# Patient Record
Sex: MALE | Race: WHITE | URBAN - METROPOLITAN AREA
[De-identification: names, ages, dates, MRNs, and addresses within clinical notes are randomized per-mention and may not be internally consistent; named-entity substitution may affect disease eponyms.]

---

## 2022-03-19 PROBLEM — N52.9 IMPOTENCE: Status: ACTIVE | Noted: 2017-07-14

## 2022-03-19 PROBLEM — E23.0 HYPOGONADOTROPIC HYPOGONADISM IN MALE (HCC): Status: ACTIVE | Noted: 2019-01-29

## 2022-03-19 PROBLEM — D75.1 POLYCYTHEMIA: Status: ACTIVE | Noted: 2021-07-16

## 2022-03-20 PROBLEM — Q89.2 THYROGLOSSAL DUCT CYST: Status: ACTIVE | Noted: 2017-07-14

## 2022-11-21 NOTE — PROGRESS NOTES
[facility] CARDIOLOGY History & Physical                 Reason for Visit: Hyperlipidemia    Subjective:     Patient is a 41 y.o. male with a PMH of hyperlipidemia who presents as a referral for hyperlipidemia. The patient denies shortness of breath and dyspnea on exertion. He reports that he experiences his heart \"racing\" when he goes \"up and down the stairs really fast\". The patient reports that it is a gradual onset and offset tachycardia. He denies angina. He does report being under a lot of stress in the last 12 months with work as a . The patient reports that the last 12 months has been \"the most stressful in my life\". He reports that the last episode of palpitations occurred today. Past Medical History:   Diagnosis Date    Allergic rhinitis     Migraine     Thyroglossal duct cyst       Past Surgical History:   Procedure Laterality Date    HERNIA REPAIR        Family History   Problem Relation Age of Onset    Heart Disease Father     Thyroid Disease Sister         thyroidectomy/hypothyroidism    Diabetes Neg Hx       Social History     Tobacco Use    Smoking status: Never    Smokeless tobacco: Never   Substance Use Topics    Alcohol use: No      Allergies   Allergen Reactions    Penicillins Other (See Comments)         ROS:  No obvious pertinent positives on review of systems except for what was outlined above.        Objective:       /76   Pulse 72   Ht 5' 10\" (1.778 m)   Wt 227 lb (103 kg)   BMI 32.57 kg/m²     BP Readings from Last 3 Encounters:   11/22/22 110/76   07/16/21 110/78       Wt Readings from Last 3 Encounters:   11/22/22 227 lb (103 kg)   07/16/21 230 lb (104.3 kg)       General/Constitutional:   Alert and oriented x 3, no acute distress  HEENT:   normocephalic, atraumatic, moist mucous membranes  Neck:   No JVD or carotid bruits bilaterally  Cardiovascular:   regular rate and rhythm, no rub/gallop appreciated  Pulmonary:   clear to auscultation bilaterally, no respiratory distress  Abdomen:   soft, non-tender, non-distended  Ext:   No sig LE edema bilaterally  Skin:  warm and dry, no obvious rashes seen  Neuro:   no obvious sensory or motor deficits  Psychiatric:   normal mood and affect    ECG:   Sinus rhythm  PVC  Heart rate 72 bpm    Data Review:   No results found for: CHOL  No results found for: TRIG  No results found for: HDL  Lab Results   Component Value Date    LDLCALC 145 10/27/2021     No results found for: LABVLDL, VLDL  No results found for: CHOLHDLRATIO     No results found for: NA, K, CL, CO2, BUN, CREATININE, GLUCOSE, CALCIUM      No results found for: ALT, AST     Assessment/Plan:   1. Hyperlipidemia, unspecified hyperlipidemia type  - Discussed with patient that his ASCVD risk is low at 2%  - No indication for statin therapy at this time  - Discussed the lack of robust data for fish oil supplement use to reduce cardiovascular outcomes and discussed that fish oil supplement use may increase the risk of atrial fibrillation  - Discontinue fish oil supplementation    2. Palpitations  - Symptoms most consistent with sinus tachycardia with a gradual onset and offset tachycardia  - Pertinent negatives include angina or anginal equivalents  - Patient with PVC noted on ECG today  - Obtain a ZIO for 3 days    3.  Obesity (BMI 30-39.9)  - Educated on Mediterranean diet and exercise    F/U: As needed    Gaby Enrique MD

## 2022-11-22 ENCOUNTER — INITIAL CONSULT (OUTPATIENT)
Dept: CARDIOLOGY CLINIC | Age: 47
End: 2022-11-22
Payer: COMMERCIAL

## 2022-11-22 VITALS
HEART RATE: 72 BPM | SYSTOLIC BLOOD PRESSURE: 110 MMHG | BODY MASS INDEX: 32.5 KG/M2 | HEIGHT: 70 IN | DIASTOLIC BLOOD PRESSURE: 76 MMHG | WEIGHT: 227 LBS

## 2022-11-22 DIAGNOSIS — E78.5 HYPERLIPIDEMIA, UNSPECIFIED HYPERLIPIDEMIA TYPE: Primary | ICD-10-CM

## 2022-11-22 DIAGNOSIS — R00.2 PALPITATIONS: ICD-10-CM

## 2022-11-22 DIAGNOSIS — E66.9 OBESITY (BMI 30-39.9): ICD-10-CM

## 2022-11-22 PROCEDURE — 93000 ELECTROCARDIOGRAM COMPLETE: CPT | Performed by: INTERNAL MEDICINE

## 2022-11-22 PROCEDURE — 99204 OFFICE O/P NEW MOD 45 MIN: CPT | Performed by: INTERNAL MEDICINE

## 2022-12-01 DIAGNOSIS — I49.3 PVC'S (PREMATURE VENTRICULAR CONTRACTIONS): Primary | ICD-10-CM

## 2022-12-02 ENCOUNTER — TELEPHONE (OUTPATIENT)
Dept: CARDIOLOGY CLINIC | Age: 47
End: 2022-12-02

## 2022-12-02 NOTE — TELEPHONE ENCOUNTER
----- Message from Margie Morton MD sent at 12/1/2022  5:41 PM EST -----  Please let the patient know that the patient was predominantly sinus rhythm with an average heart rate of 89 bpm.  He had occasional PVCs at a 3.1% burden. He did not trigger the monitor nor did he keep a diary. I ordered an echocardiogram to assess for structural heart disease in the setting of occasional PVCs. Please facilitate scheduling the echocardiogram and have him see me after the echocardiogram.  If you do not know when the echocardiogram will be scheduled, schedule him to see me in 4 weeks.

## 2022-12-02 NOTE — TELEPHONE ENCOUNTER
Advised patient of monitor results and Dr. Amna Hayes response. Advised patient that someone from Scheduling Dept will be calling to schedule echo appointment and FU appointment with Dr. Anastacia Tabares. Patient verbalized understanding. Notified Selvin Mascorro in Scheduling  Dept of Dr. Amna Hayes request to schedule echo and FU appt with Dr. Anastacia Tabares within 1 month. Routed this triage note to scheduling pool.

## 2022-12-07 ENCOUNTER — TELEPHONE (OUTPATIENT)
Dept: CARDIOLOGY CLINIC | Age: 47
End: 2022-12-07

## 2022-12-07 NOTE — TELEPHONE ENCOUNTER
----- Message from Domenic Severin, MD sent at 12/7/2022  8:09 AM EST -----  Please let the patient know that the heart function is normal on ECHO.

## 2023-01-13 ENCOUNTER — OFFICE VISIT (OUTPATIENT)
Dept: CARDIOLOGY CLINIC | Age: 48
End: 2023-01-13
Payer: COMMERCIAL

## 2023-01-13 VITALS
WEIGHT: 233 LBS | DIASTOLIC BLOOD PRESSURE: 82 MMHG | HEART RATE: 94 BPM | HEIGHT: 70 IN | SYSTOLIC BLOOD PRESSURE: 114 MMHG | BODY MASS INDEX: 33.36 KG/M2

## 2023-01-13 DIAGNOSIS — E78.5 HYPERLIPIDEMIA, UNSPECIFIED HYPERLIPIDEMIA TYPE: Primary | ICD-10-CM

## 2023-01-13 DIAGNOSIS — I49.3 PVC'S (PREMATURE VENTRICULAR CONTRACTIONS): ICD-10-CM

## 2023-01-13 DIAGNOSIS — E66.9 OBESITY (BMI 30-39.9): ICD-10-CM

## 2023-01-13 PROCEDURE — 99214 OFFICE O/P EST MOD 30 MIN: CPT | Performed by: INTERNAL MEDICINE

## 2023-01-13 NOTE — PROGRESS NOTES
[facility] CARDIOLOGY Follow Up                 Reason for Visit: PVCs    Subjective:     Patient is a 41 y.o. male with a PMH of hyperlipidemia and PVCs who presents for follow-up. The patient was last seen in November 2022. Fish oil was discontinued. A ZIO for 3 days was ordered for palpitations. He wore an ambulatory ECG monitor on November 22 that was noted to demonstrate predominantly sinus rhythm with an average heart rate of 89 bpm.  He was noted to have occasional/asymptomatic PVCs at a 3.1% burden. He had a TTE on December 6 that was noted to demonstrate a normal EF. The patient denies chest pain and dyspnea on exertion. He does report taking pseudoephedrine several times a week. He reports having COVID-19 about 2 weeks before [place]. Past Medical History:   Diagnosis Date    Allergic rhinitis     Migraine     Thyroglossal duct cyst       Past Surgical History:   Procedure Laterality Date    HERNIA REPAIR        Family History   Problem Relation Age of Onset    Heart Disease Father     Thyroid Disease Sister         thyroidectomy/hypothyroidism    Diabetes Neg Hx       Social History     Tobacco Use    Smoking status: Never    Smokeless tobacco: Never   Substance Use Topics    Alcohol use: No      Allergies   Allergen Reactions    Penicillins Other (See Comments)         ROS:  No obvious pertinent positives on review of systems except for what was outlined above. Objective: There were no vitals taken for this visit.     BP Readings from Last 3 Encounters:   12/06/22 120/80   11/22/22 110/76   07/16/21 110/78       Wt Readings from Last 3 Encounters:   12/06/22 227 lb (103 kg)   11/22/22 227 lb (103 kg)   07/16/21 230 lb (104.3 kg)       General/Constitutional:   Alert and oriented x 3, no acute distress  HEENT:   normocephalic, atraumatic, moist mucous membranes  Neck:   No JVD or carotid bruits bilaterally  Cardiovascular:   regular rate and rhythm, no rub/gallop appreciated  Pulmonary:   clear to auscultation bilaterally, no respiratory distress  Abdomen:   soft, non-tender, non-distended  Ext:   No sig LE edema bilaterally  Skin:  warm and dry, no obvious rashes seen  Neuro:   no obvious sensory or motor deficits  Psychiatric:   normal mood and affect    Data Review:   No results found for: CHOL  No results found for: TRIG  No results found for: HDL  Lab Results   Component Value Date    LDLCALC 145 10/27/2021     No results found for: LABVLDL, VLDL  No results found for: CHOLHDLRATIO     No results found for: NA, K, CL, CO2, BUN, CREATININE, GLUCOSE, CALCIUM      No results found for: ALT, AST     Assessment/Plan:   1. Hyperlipidemia, unspecified hyperlipidemia type  - Lipid panel reviewed from April 2022  - ASCVD risk 2%; therefore, no indication for statin therapy at this time    2. PVC's (premature ventricular contractions)  - Minimally symptomatic and well-tolerated  - TTE in December 2022 noted a normal EF  - Pertinent negatives include angina or anginal equivalents  - After shared decision-making strategy, it is decided not to start beta-blockade    3.  Obesity (BMI 30-39.9)  - Educated on Mediterranean diet and exercise    F/U: As needed    Olive Gong MD